# Patient Record
(demographics unavailable — no encounter records)

---

## 2025-01-13 NOTE — HISTORY OF PRESENT ILLNESS
[de-identified] : 54 y/o obese female with h/o obesity, spinal stenosis s/p fusion in 2013 presents for annual exam.    Doing okay.  No changes in weight.  Mounjaro not covered by insurance.  No change after course of Saxenda.  Interested in restarting Qsymia as has helped in the past   Employed former smoker

## 2025-01-13 NOTE — HEALTH RISK ASSESSMENT
[Good] : ~his/her~  mood as  good [No] : In the past 12 months have you used drugs other than those required for medical reasons? No [No falls in past year] : Patient reported no falls in the past year [0] : 2) Feeling down, depressed, or hopeless: Not at all (0) [PHQ-2 Negative - No further assessment needed] : PHQ-2 Negative - No further assessment needed [Former] : Former [5-9] : 5-9 [> 15 Years] : > 15 Years [[id number]] : 0 [MammogramDate] : 12/2024 [PapSmearDate] : 2024 [ColonoscopyDate] : 2021 [ColonoscopyComments] : Advised to have records faxed over as not in chart

## 2025-01-13 NOTE — PHYSICAL EXAM
[Normal] : normal rate, regular rhythm, normal S1 and S2 and no murmur heard [Soft] : abdomen soft [Non Tender] : non-tender [Non-distended] : non-distended [No HSM] : no HSM [Normal Posterior Cervical Nodes] : no posterior cervical lymphadenopathy [Normal Anterior Cervical Nodes] : no anterior cervical lymphadenopathy [No CVA Tenderness] : no CVA  tenderness [No Joint Swelling] : no joint swelling [No Rash] : no rash [No Focal Deficits] : no focal deficits [Normal Affect] : the affect was normal [Normal Mood] : the mood was normal

## 2025-01-13 NOTE — ASSESSMENT
[FreeTextEntry1] : //  h/o RBBB, no chest pain, palpitations, or soboe. Cardio work-up normal in 2017.  holter monitor-negative in 2022 -monitor   Hyperlipidemia, last LDL-156 -continue atorvastatin  -Advised decrease greasy, fatty foods, increase exercise, fiber intake  -Elevated cholesterol has been linked to increase in cardiovascular even such as heart attack, stroke, peripheral artery disease and death.    Obesity, no major change over the last several years.  Plateaued somewhat with Qsymia.  Stopped Saxenda earlier this year due to episodes of diarrhea, symptoms grossly resolved.  mounjaro-not covered  Has starting walking regimen with her sister-in-law.   -restart Qsymia, risks and benefits of medication discussed in detail   -Being overweight increases your risk of health conditions such as heart disease, high blood pressure, type 2 diabetes, and certain types of cancer. It can also increase your risk for osteoarthritis, sleep apnea, and other respiratory problems. Aim for a slow, steady weight loss. Even a small amount of weight loss can lower your risk of health problems. -A safe and healthy way to lose weight is to eat fewer calories and get regular exercise. You can lose up about 1 pound a week by decreasing the number of calories you eat by 500 calories each day. You can decrease calories by eating smaller portion sizes or by cutting out high-calorie foods. Read labels to find out how many calories are in the foods you eat. You can also burn calories with exercise such as walking, swimming, or biking. You will be more likely to keep weight off if you make these changes part of your lifestyle. -Exercise at least 30 minutes per day on most days of the week. Some examples of exercise include walking, biking, dancing, and swimming. You can also fit in more physical activity by taking the stairs instead of the elevator or parking farther away from stores.  Healthcare Maintenance -Advise Yearly Skin cancer screening with Dermatologist  -Advise Yearly Eye exam with Ophthalmologist -Advise Yearly Dental exam -Educated of the importance of Healthy diet, such as Mediterranean Diet and Exercise, such as walking >20 minutes a day and increasing gradually as tolerated  Immunizations -Flu vaccine -declined  -Covid vaccine  -shingles vaccine (shingrix) -up to date   Preventative screening  -advised to get PAP for cervical cancer screening -up to date  -advised to get mammogram for breast cancer screening -up to date  -advised to get bone density for osteoporosis screening -will start next year  -advised to get colonoscopy for colon cancer screening -up to date, have report faxed to review   Follow-up in 2 months for weight check

## 2025-01-30 NOTE — ADDENDUM
[FreeTextEntry1] : I, BUSHRA MANCIA, acted solely as a scribe for Dr. Mika Olivarez on this date 01/30/2025.  All medical record entries made by the Scribe were at my, Dr. Mika Olivarez, direction and personally dictated by me on 01/30/2025. I have reviewed the chart and agree that the record accurately reflects my personal performance of the history, physical exam, assessment and plan. I have also personally directed, reviewed, and agreed with the chart.

## 2025-01-30 NOTE — DISCUSSION/SUMMARY
[de-identified] : I went over the pathophysiology of the patient's symptoms in great detail with the patient. I discussed the underlying pathophysiology of the patient's condition in great detail with the patient. I went over the patient's x-rays with them in great detail. The patient elected to receive a cortisone injection into her left knee today, and tolerated it well. I instructed the patient on ROM exercises, and told them to take it easy. The use of ice and rest was reviewed with the patient. The patient may resume activities tomorrow. Viscosupplementation was discussed as a solution to the patient's symptoms and a booklet with information was provided. I stressed the importance of weight loss and its benefits to the patients joints and overall health. We discussed the use of ice, Tylenol and anti-inflammatories to relieve pain. Viscosupplementation was discussed as a solution to the patient's symptoms, and we are requesting Monovisc for the left knee.   All of their questions were answered. They understand and consent to the plan.   FU after authorization.

## 2025-01-30 NOTE — HISTORY OF PRESENT ILLNESS
[de-identified] : 55 year old female presents complaining of left knee pain that started about 6 weeks ago. The patient cannot attribute her pain to any injury, fall, or trauma. She states that her pain is worst about the anterior and medial aspects. She denies buckling. She has pain when going up and down stairs. She has pain when sleeping at night. She has not been treated for her knee in the past. She has difficulty kneeling, squatting, and standing up from a seated position. Patient states that she has difficulty when ascending and descending stairs. Of note, her right hip was replaced by Dr. Kelly.

## 2025-01-30 NOTE — PHYSICAL EXAM
[de-identified] : Constitutional o Appearance : well-nourished, well developed, alert, in no acute distress  Head and Face o Head :  Inspection : atraumatic, normocephalic o Face :  Inspection : no visible rash or discoloration Respiratory o Respiratory Effort: breathing unlabored  Neurologic o Mental Status Examination :  Orientation : grossly oriented to person, place and time Psychiatric o Mood and Affect: mood normal, affect appropriate   Right Lower Extremity o Buttock : no tenderness, swelling or deformities  o Right Hip :  Inspection/Palpation : no tenderness, swelling or deformities  Range of Motion : full and painless in all planes, no crepitance  Stability : joint stability intact  Strength : extension, flexion, adduction, abduction, internal rotation and external rotation 5/5   o Right Knee :  Inspection/Palpation : no medial compartment tenderness or lateral facet tenderness  to palpation, no swelling  Range of Motion : active flexion and extension full and painless, no crepitance, decreased patellofemoral glide   Stability : no valgus or varus instability present on provocative testing  Strength : flexion and extension 5/5  Tests and Signs : Anterior Drawer negative, Lachman negative, Jose's negative  Left Lower Extremity o Buttock : no tenderness, swelling or deformities  o Left Hip :  Inspection/Palpation : no tenderness, no swelling or deformities  Range of Motion : full and painless in all planes, no crepitance  Stability : joint stability intact  Strength : extension, flexion, adduction, abduction, internal rotation and external rotation 5/5  o Left Knee :  Inspection/Palpation : mild medial compartment tenderness to palpation, no swelling  Range of Motion : 0-115 active flexion and extension full and painless, + crepitance, decreased patellofemoral glide   Stability : no valgus or varus instability present on provocative testing  Strength : flexion and extension 5/5  Tests and Signs : Anterior Drawer negative, Lachman negative, Jose's negative  Gait: normal gait, no significant extremity swelling or lymphedema  Radiology Results: 1/30/2025  Right Knee: Standing AP, lateral, tunnel and skyline views were obtained and reveal severe patellofemoral arthritis with moderate lateral compartment and patellofemoral arthritis  Left Knee: Standing AP, lateral, tunnel and skyline views were obtained and reveal moderate to severe medial and patellofemoral   o Knee injection : Indication- knee osteoarthritis, Anatomic location- left intra-articular joint space, Spray - area was sterilized with Betadine and alcohol and anesthetized with Ethyl Chloride , needle used-20G, Medications given- 5cc's lidocaine, 0.5cc's kenalog, 0.5 cc's dexamethasone. The patient tolerated the procedure well.

## 2025-03-06 NOTE — HISTORY OF PRESENT ILLNESS
[de-identified] : 56 yo f has been gradually noticing difficulty hearing in busy areas and hearing the television. No inciting event. She has not had hearing problems in the past. Denies noise exposure. No more vertigo. Here with her .

## 2025-03-06 NOTE — HISTORY OF PRESENT ILLNESS
[de-identified] : 54 yo f has been gradually noticing difficulty hearing in busy areas and hearing the television. No inciting event. She has not had hearing problems in the past. Denies noise exposure. No more vertigo. Here with her .

## 2025-03-06 NOTE — ASSESSMENT
[FreeTextEntry1] : b snhl- explained how hf snhl makes it difficult to hear over background noise and with tv rec repeat  1 yr ,asked to call for problems sooner, conservative mgmt at present

## 2025-03-13 NOTE — ADDENDUM
[FreeTextEntry1] : I, BUSHRA MANCIA, acted solely as a scribe for Dr. Mika Olivarez on this date 03/13/2025.  All medical record entries made by the Scribe were at my, Dr. Mika Olivarez, direction and personally dictated by me on 03/13/2025. I have reviewed the chart and agree that the record accurately reflects my personal performance of the history, physical exam, assessment and plan. I have also personally directed, reviewed, and agreed with the chart.

## 2025-03-13 NOTE — DISCUSSION/SUMMARY
[de-identified] : I went over the pathophysiology of the patient's symptoms in great detail with the patient. The patient elected to receive a Durolane injection into her left knee today, and tolerated it well. I instructed the patient on ROM exercises, and told them to take it easy. The use of ice and rest was reviewed with the patient. The patient may resume activities tomorrow. I reminded the patient that it takes 4 to 6 weeks after the final injection to feel symptom relief.   All of their questions were answered. They understand and consent to the plan.   FU in 6 weeks.

## 2025-03-13 NOTE — HISTORY OF PRESENT ILLNESS
[de-identified] : 55 year old female presents for a left knee Durolane injection today 3/13/2025. She denies any swelling or buckling. She had a left knee cortisone injection on 1/30/2025 and reports relief. Of note, her right hip was replaced by Dr. Kelly.   Radiology Results: 1/30/2025  Right Knee: Standing AP, lateral, tunnel and skyline views were obtained and reveal severe patellofemoral arthritis with moderate lateral compartment and patellofemoral arthritis  Left Knee: Standing AP, lateral, tunnel and skyline views were obtained and reveal moderate to severe medial and patellofemoral

## 2025-03-13 NOTE — PHYSICAL EXAM
[de-identified] : Constitutional o Appearance : well-nourished, well developed, alert, in no acute distress  Head and Face o Head :  Inspection : atraumatic, normocephalic o Face :  Inspection : no visible rash or discoloration Respiratory o Respiratory Effort: breathing unlabored  Neurologic o Mental Status Examination :  Orientation : grossly oriented to person, place and time Psychiatric o Mood and Affect: mood normal, affect appropriate   Right Lower Extremity o Buttock : no tenderness, swelling or deformities  o Right Hip :  Inspection/Palpation : no tenderness, swelling or deformities  Range of Motion : full and painless in all planes, no crepitance  Stability : joint stability intact  Strength : extension, flexion, adduction, abduction, internal rotation and external rotation 5/5   o Right Knee :  Inspection/Palpation : no medial compartment tenderness or lateral facet tenderness  to palpation, no swelling  Range of Motion : active flexion and extension full and painless, no crepitance, decreased patellofemoral glide   Stability : no valgus or varus instability present on provocative testing  Strength : flexion and extension 5/5  Tests and Signs : Anterior Drawer negative, Lachman negative, Jose's negative  Left Lower Extremity o Buttock : no tenderness, swelling or deformities  o Left Hip :  Inspection/Palpation : no tenderness, no swelling or deformities  Range of Motion : full and painless in all planes, no crepitance  Stability : joint stability intact  Strength : extension, flexion, adduction, abduction, internal rotation and external rotation 5/5  o Left Knee :  Inspection/Palpation : mild medial compartment tenderness to palpation, no swelling  Range of Motion : 0-115 active flexion and extension full and painless, + crepitance, decreased patellofemoral glide   Stability : no valgus or varus instability present on provocative testing  Strength : flexion and extension 5/5  Tests and Signs : Anterior Drawer negative, Lachman negative, Jose's negative  Gait: normal gait, no significant extremity swelling or lymphedema  o Knee injection : Indication- left knee osteoarthritis, Anatomic location- left intra-articular joint space, Spray - area was sterilized with Betadine and alcohol and anesthetized with Ethyl Chloride , needle used-20G, Medications given- 3cc's Durolane under Ultrasound guidance. The patient tolerated the procedure well.

## 2025-03-18 NOTE — PHYSICAL EXAM
[General Appearance - Well Developed] : well developed [Normal Appearance] : normal appearance [Well Groomed] : well groomed [General Appearance - Well Nourished] : well nourished [No Deformities] : no deformities [General Appearance - In No Acute Distress] : no acute distress [Eyelids - No Xanthelasma] : the eyelids demonstrated no xanthelasmas [Normal Oral Mucosa] : normal oral mucosa [No Oral Pallor] : no oral pallor [No Oral Cyanosis] : no oral cyanosis [Normal Jugular Venous A Waves Present] : normal jugular venous A waves present [Normal Jugular Venous V Waves Present] : normal jugular venous V waves present [No Jugular Venous Harper A Waves] : no jugular venous harper A waves [Respiration, Rhythm And Depth] : normal respiratory rhythm and effort [Exaggerated Use Of Accessory Muscles For Inspiration] : no accessory muscle use [Auscultation Breath Sounds / Voice Sounds] : lungs were clear to auscultation bilaterally [Heart Rate And Rhythm] : heart rate and rhythm were normal [Heart Sounds] : normal S1 and S2 [Murmurs] : no murmurs present [Abdomen Soft] : soft [Abdomen Tenderness] : non-tender [Abdomen Mass (___ Cm)] : no abdominal mass palpated [Abnormal Walk] : normal gait [Gait - Sufficient For Exercise Testing] : the gait was sufficient for exercise testing [] : no rash [No Skin Ulcers] : no skin ulcer [No Xanthoma] : no  xanthoma was observed [Oriented To Time, Place, And Person] : oriented to person, place, and time [Affect] : the affect was normal [Mood] : the mood was normal [No Anxiety] : not feeling anxious [Well Developed] : well developed [Well Nourished] : well nourished [No Acute Distress] : no acute distress [Normal Conjunctiva] : normal conjunctiva [Normal Venous Pressure] : normal venous pressure [No Carotid Bruit] : no carotid bruit [Normal S1, S2] : normal S1, S2 [No Rub] : no rub [No Gallop] : no gallop [Clear Lung Fields] : clear lung fields [Good Air Entry] : good air entry [No Respiratory Distress] : no respiratory distress  [Soft] : abdomen soft [Non Tender] : non-tender [No Masses/organomegaly] : no masses/organomegaly [Normal Bowel Sounds] : normal bowel sounds [Normal Gait] : normal gait [No Edema] : no edema [No Cyanosis] : no cyanosis [No Clubbing] : no clubbing [No Varicosities] : no varicosities [No Rash] : no rash [No Skin Lesions] : no skin lesions [Moves all extremities] : moves all extremities [No Focal Deficits] : no focal deficits [Normal Speech] : normal speech [Alert and Oriented] : alert and oriented [Normal memory] : normal memory [FreeTextEntry1] : +LE Venous Stasis Changes [de-identified] : +II/VI CONG

## 2025-03-18 NOTE — HISTORY OF PRESENT ILLNESS
[FreeTextEntry1] : 47 y.o.F - h.o. Pseudotumor Cerebri, Arthritis, HTN, HL, Former Smoker, Obesity, Early Coronary Disease and AAA in Father, HTN on Triamterene,Venous Insufficiency, RBBB presenting for cardiovascular evaluation ------------------ =========== Patients walks well without limitation. No reported CP/SOB/Palpitations Joined Weight watcher's - lost weight ----------------  Primary picked up a new right bundle branch block Otherwise feeling wel.  Some wreight loss.  Some fluttering.  TTE -  - LVEF  Nl; Mild to Mod MR Holter/Zio- - Nl =============== =============== ----------------- ----------------- 3-2025 CC: Heart Issues - Patient reports no chest pain, no localization to the sternum, no radiation to the neck/jaw, no alleviating nor worsening precipitants to CP, no assoc symptoms to CP no alleviating nor worsening precipitants to CP, no assoc symptoms to CP - Patient notes no associated SOB/Palps/Leg swelling - Reports No associated F/C/N/V/Headaches - Reports Normal Exercise Tolerance - Reports no medication changes - Reports normal mood/quality of life - Reports no associated midnight awakenings from cP - Reports no diet changes - Reports no associated body aches - Reports no recent colds/viruses - Recent labs/imaging reviewed - Relevant Family history reviewed Mother/Father - CV Risk Assessment for 10 Year ACC/AHA Pooled Risk Cohort Equation places this person at < 7.5% Risk of ASCVD  ------------------

## 2025-03-18 NOTE — REVIEW OF SYSTEMS
[Negative] : Heme/Lymph [Dyspnea on exertion] : dyspnea during exertion [Palpitations] : no palpitations [FreeTextEntry1] : Chronic LE Venous Stasis Changes

## 2025-03-18 NOTE — PHYSICAL EXAM
[General Appearance - Well Developed] : well developed [Normal Appearance] : normal appearance [Well Groomed] : well groomed [General Appearance - Well Nourished] : well nourished [No Deformities] : no deformities [General Appearance - In No Acute Distress] : no acute distress [Eyelids - No Xanthelasma] : the eyelids demonstrated no xanthelasmas [Normal Oral Mucosa] : normal oral mucosa [No Oral Pallor] : no oral pallor [No Oral Cyanosis] : no oral cyanosis [Normal Jugular Venous A Waves Present] : normal jugular venous A waves present [Normal Jugular Venous V Waves Present] : normal jugular venous V waves present [No Jugular Venous Harper A Waves] : no jugular venous harper A waves [Respiration, Rhythm And Depth] : normal respiratory rhythm and effort [Exaggerated Use Of Accessory Muscles For Inspiration] : no accessory muscle use [Auscultation Breath Sounds / Voice Sounds] : lungs were clear to auscultation bilaterally [Heart Rate And Rhythm] : heart rate and rhythm were normal [Heart Sounds] : normal S1 and S2 [Murmurs] : no murmurs present [Abdomen Soft] : soft [Abdomen Tenderness] : non-tender [Abdomen Mass (___ Cm)] : no abdominal mass palpated [Abnormal Walk] : normal gait [Gait - Sufficient For Exercise Testing] : the gait was sufficient for exercise testing [] : no rash [No Skin Ulcers] : no skin ulcer [No Xanthoma] : no  xanthoma was observed [Oriented To Time, Place, And Person] : oriented to person, place, and time [Affect] : the affect was normal [Mood] : the mood was normal [No Anxiety] : not feeling anxious [Well Developed] : well developed [Well Nourished] : well nourished [No Acute Distress] : no acute distress [Normal Conjunctiva] : normal conjunctiva [Normal Venous Pressure] : normal venous pressure [No Carotid Bruit] : no carotid bruit [Normal S1, S2] : normal S1, S2 [No Rub] : no rub [No Gallop] : no gallop [Clear Lung Fields] : clear lung fields [Good Air Entry] : good air entry [No Respiratory Distress] : no respiratory distress  [Soft] : abdomen soft [Non Tender] : non-tender [No Masses/organomegaly] : no masses/organomegaly [Normal Bowel Sounds] : normal bowel sounds [Normal Gait] : normal gait [No Edema] : no edema [No Cyanosis] : no cyanosis [No Clubbing] : no clubbing [No Varicosities] : no varicosities [No Rash] : no rash [No Skin Lesions] : no skin lesions [Moves all extremities] : moves all extremities [No Focal Deficits] : no focal deficits [Normal Speech] : normal speech [Alert and Oriented] : alert and oriented [Normal memory] : normal memory [FreeTextEntry1] : +LE Venous Stasis Changes [de-identified] : +II/VI CONG

## 2025-03-18 NOTE — DISCUSSION/SUMMARY
[EKG obtained to assist in diagnosis and management of assessed problem(s)] : EKG obtained to assist in diagnosis and management of assessed problem(s) [FreeTextEntry1] : 47 y.o.F - h.o. Pseudotumor Cerebri, Arthritis, HTN, HL, Former Smoker, Obesity, Early Coronary Disease and AAA in Father,Chronic LE Edema, HTN on Triamterene ------------------ ============ TTE -  - LVEF  Nl; Mild to Mod MR Holter/Zio- - Nl ------------------ ============ 1. Early CAD RFs/HTN - Intermediate CAD Risk - Will use CT Coronary Score to help guide risk management/Statin therapy - Baby ASA - Continue Wt Watchers - Cointinue current meds 1.  RBBB Mn Systolic Murmur (holosystolic) I.VI apical c/f MR AND JHAVERI New - TTE -Echo  2. [place]  LE ed - refer to Dr. Gaitan                        Cholesterol management - Assessed - Impression is active; changes as per above - Discussed diet and exercise at length - Any lifestyle/medication changes as per above Risk factors for cardiomyopathy - Assessed - Impression is stable - Reviewed records from PCP BP - Assessed - Impression is active Cardiac Health optimization - Discussed diet and exercise at length - Discussed importance of monitoring and re-assessment of cardiac health on further visits                          ----------- 15 minutes was provided for preventive counseling on healthy diet, weight maintenance, CV risk reduction. Specifically, and separate from other cardiovascular evaluation and treatment, we discussed h willingness to focus  on lifestyle changes, particularly dietary; including greater consumption of vegetables, fruits over saturated fats. We discussed appropriate follow up to monitor progress on these areas.     -                                                                                                             -                         -

## 2025-03-23 NOTE — HISTORY OF PRESENT ILLNESS
[FreeTextEntry1] : new pt establish care [de-identified] : Ms. MONTIEL is a 55-year-old F pmhx of HLD, Arthritis, Vertigo, morbid obesity, b/l sensorineural hearing loss, alopecia who presents at the office today to establish care. Dr. Swann referred pt to me. Overall, pt feels well. No complaints. Denies chest pain, SOB, JHAVERI, dizziness, diaphoresis, palpitations, LE swelling, orthopnea, syncope, n/v, headache.

## 2025-03-23 NOTE — HISTORY OF PRESENT ILLNESS
[FreeTextEntry1] : new pt establish care [de-identified] : Ms. MONTIEL is a 55-year-old F pmhx of HLD, Arthritis, Vertigo, morbid obesity, b/l sensorineural hearing loss, alopecia who presents at the office today to establish care. Dr. Swann referred pt to me. Overall, pt feels well. No complaints. Denies chest pain, SOB, JHAVERI, dizziness, diaphoresis, palpitations, LE swelling, orthopnea, syncope, n/v, headache.

## 2025-03-23 NOTE — HISTORY OF PRESENT ILLNESS
[FreeTextEntry1] : new pt establish care [de-identified] : Ms. MONTIEL is a 55-year-old F pmhx of HLD, Arthritis, Vertigo, morbid obesity, b/l sensorineural hearing loss, alopecia who presents at the office today to establish care. Dr. Swann referred pt to me. Overall, pt feels well. No complaints. Denies chest pain, SOB, JHAVERI, dizziness, diaphoresis, palpitations, LE swelling, orthopnea, syncope, n/v, headache. Neurology Attending

## 2025-03-23 NOTE — HEALTH RISK ASSESSMENT
[0] : 2) Feeling down, depressed, or hopeless: Not at all (0) [PHQ-2 Negative - No further assessment needed] : PHQ-2 Negative - No further assessment needed [Former] : Former [[id number]] : 0

## 2025-03-23 NOTE — HEALTH RISK ASSESSMENT
[0] : 2) Feeling down, depressed, or hopeless: Not at all (0) [PHQ-2 Negative - No further assessment needed] : PHQ-2 Negative - No further assessment needed [Former] : Former [[id number]] : 0

## 2025-03-23 NOTE — HEALTH RISK ASSESSMENT
[0] : 2) Feeling down, depressed, or hopeless: Not at all (0) [PHQ-2 Negative - No further assessment needed] : PHQ-2 Negative - No further assessment needed [Former] : Former [[id number]] : 0

## 2025-03-23 NOTE — ADDENDUM
[FreeTextEntry1] : This note was written by Serenity Call on 03/20/2025 acting as medical scribe for Dr. Peter Plata. I, Dr. Peter Plata, have read and attest that all the information, medical decision making and discharge instructions within are true and accurate.

## 2025-04-24 NOTE — ADDENDUM
[FreeTextEntry1] : I, BUSHRA MANCIA, acted solely as a scribe for Dr. Mika Olivarez on this date 04/24/2025.  All medical record entries made by the Scribe were at my, Dr. Mika Olivarez, direction and personally dictated by me on 04/24/2025. I have reviewed the chart and agree that the record accurately reflects my personal performance of the history, physical exam, assessment and plan. I have also personally directed, reviewed, and agreed with the chart.

## 2025-04-24 NOTE — PHYSICAL EXAM
[de-identified] : Constitutional o Appearance : well-nourished, well developed, alert, in no acute distress  Head and Face o Head :  Inspection : atraumatic, normocephalic o Face :  Inspection : no visible rash or discoloration Respiratory o Respiratory Effort: breathing unlabored  Neurologic o Mental Status Examination :  Orientation : grossly oriented to person, place and time Psychiatric o Mood and Affect: mood normal, affect appropriate   Right Lower Extremity o Buttock : no tenderness, swelling or deformities  o Right Hip :  Inspection/Palpation : no tenderness, swelling or deformities  Range of Motion : full and painless in all planes, no crepitance  Stability : joint stability intact  Strength : extension, flexion, adduction, abduction, internal rotation and external rotation 4/5   o Right Knee :  Inspection/Palpation : no medial compartment tenderness or lateral facet tenderness  to palpation, no swelling  Range of Motion : active flexion and extension full and painless, no crepitance, decreased patellofemoral glide   Stability : no valgus or varus instability present on provocative testing  Strength : flexion and extension 4/5  Tests and Signs : Anterior Drawer negative, Lachman negative, Jose's negative  Left Lower Extremity o Buttock : no tenderness, swelling or deformities  o Left Hip :  Inspection/Palpation : no tenderness, no swelling or deformities  Range of Motion : full and painless in all planes, no crepitance  Stability : joint stability intact  Strength : extension, flexion, adduction, abduction, internal rotation and external rotation 4/5  o Left Knee :  Inspection/Palpation : mild medial compartment tenderness to palpation, no swelling  Range of Motion : 0-115 active flexion and extension full and painless, patellofemoral crepitance, decreased patellofemoral glide   Stability : no valgus or varus instability present on provocative testing  Strength : flexion and extension 4/5  Tests and Signs : Anterior Drawer negative, Lachman negative, Jose's negative  Gait: normal gait, limited core strength, no significant extremity swelling or lymphedema

## 2025-04-24 NOTE — HISTORY OF PRESENT ILLNESS
[de-identified] : 55 year old female presents for a left knee follow-up. She had a left knee Durolane injection on 3/13/2025 and reports it did not work. She denies any swelling or buckling. She does note some weakness. She has difficulty kneeling, squatting, and standing up from a seated position.  She had a left knee cortisone injection on 1/30/2025 and reports relief for a couple of weeks. She has lost 6 pounds. Of note, her right hip was replaced by Dr. Kelly.   Radiology Results: 1/30/2025  Right Knee: Standing AP, lateral, tunnel and skyline views were obtained and reveal severe patellofemoral arthritis with moderate lateral compartment and patellofemoral arthritis  Left Knee: Standing AP, lateral, tunnel and skyline views were obtained and reveal moderate to severe medial and patellofemoral

## 2025-05-12 NOTE — REASON FOR VISIT
[FreeTextEntry1] :  Telehealth visit today on [facility]. Telehealth was done using 2 way audiovisual.  Patient was at home. Provider was in home office.  Consent was provided by patient.  Only patient and provider in the visit.

## 2025-05-12 NOTE — HISTORY OF PRESENT ILLNESS
[FreeTextEntry1] : Ms. PARISH MONTIEL  is a 55 year old female who has a past medical history significant for  Pseudotumor Cerebri, Arthritis, HTN, HL, Former Smoker, Obesity, Early Coronary Disease and AAA in Father, HTN on Triamterene,Venous Insufficiency, RBBB presenting for cardiovascular evaluation who presents via telehealth for a follow up evaluation. Patient feels generally well today. Denies SOB, chest pain, palpitations, dizziness, and syncope.     ================ ================    5/12/2025 MED:  Currently on  Zepbound 2.5 mg  mg dose,  atorvastatin 20 mg daily  - Appetite suppression not present. - No side effects, feels well. - Has lost 6 lbs -Following low cholesterol diet, making lifestyle modifications and taking Anti cholesterol medications as directed, - Will start new dose  Zepbound 5 mg and reassess in one month

## 2025-05-12 NOTE — DISCUSSION/SUMMARY
[FreeTextEntry1] :   Diagnosis: HLD, morbid obesity   - Patient to start  Zepbound 5 mg mg once weekly for four weeks. All side effects reviewed. Questions and concerns addressed. - A detailed discussion took place about the importance of CV risk reduction - The patient understands the importance of incorporating moderate aerobic exercise, 4 times/week for 40 minutes to reduce risk of CV disease. - A detailed discussion of lifestyle modification was done today. Patient understands the importance of a heart healthy diet and incorporating veggies/legumes/fruits/whole grains and limiting processed foods, sugars and carbs in their diet. - patient understands that stress reduction along with good sleep hygiene will help aid in weight loss -Following low cholesterol diet, making lifestyle modifications and taking Anti cholesterol medications. - Follow up in 4 weeks   - The patient has a good understanding of the diagnosis, treatment plan and lifestyle modification. she will contact me at the office for any questions with their care or any changes in their health status.   Case discussed with Dr Max Swann.

## 2025-07-07 NOTE — HISTORY OF PRESENT ILLNESS
[FreeTextEntry1] : Establish Care, CPE [de-identified] : Ms. MONTIEL is a 56 year old female that presents to the office as a new patient for a CPE. Pt feeling well overall, no complaints. Some lower extremity swelling of ankles - went to vascular - normal workup. PMHx: Pseudotumor Cerebri, Arthritis, HTN, HLD, Former Smoker, Obesity, Early Coronary Disease and AAA in Father, HTN on Triamterene, Venous Insufficiency, RBBB Last used Triamterene a few years ago Taking zepbound 5mg qweekly Atorvastatin 20mg Doing intermittent fasting  F/w ortho for L knee pain, s/p injection  colonoscopy: 9/2020, GI referral, clean, 10 year repeat mammogram: 12/2025 pap smear: 6/2024, follows with GYN annually  Recent ECG, March 2025 reviewed, SR, KIM Had TTE in March  Weight stable since March, 281 lb BP WNL, 110/70  Fasting labs today Shingrix UTD, x2 doses

## 2025-07-07 NOTE — DISCUSSION/SUMMARY
[FreeTextEntry1] :   - Patient continue Zepbound 5mg once weekly for four weeks. All side effects reviewed. Questions and concerns addressed. start IB guard as directed and Pepto bismol at time of injection for the 24-48hours to improve symptoms of diarrhea - increase protein intake  - prescription e-prescribed to patients agreed upon [facility] pharmacy  - A detailed discussion took place about the importance of CV risk reduction - The patient understands the importance of incorporating moderate aerobic exercise, 4 times/week for 40 minutes to reduce risk of CV disease. - A detailed discussion of lifestyle modification was done today. Patient understands the importance of a heart healthy diet and incorporating veggies/legumes/fruits/whole grains and limiting processed foods, sugars and carbs in their diet. - patient understands that stress reduction along with good sleep hygiene will help aid in weight loss - Follow up in 4 weeks   - The patient has a good understanding of the diagnosis, treatment plan and lifestyle modification. she will contact me at the office for any questions with their care or any changes in their health status.   Case discussed with Dr Max Swann.

## 2025-07-07 NOTE — HEALTH RISK ASSESSMENT
[0] : 2) Feeling down, depressed, or hopeless: Not at all (0) [PHQ-2 Negative - No further assessment needed] : PHQ-2 Negative - No further assessment needed [Former] : Former [Time Spent: ___ Minutes] : I spent [unfilled] minutes performing a depression screening for this patient. [None] : None [Feels Safe at Home] : Feels safe at home [Fully functional (bathing, dressing, toileting, transferring, walking, feeding)] : Fully functional (bathing, dressing, toileting, transferring, walking, feeding) [Fully functional (using the telephone, shopping, preparing meals, housekeeping, doing laundry, using] : Fully functional and needs no help or supervision to perform IADLs (using the telephone, shopping, preparing meals, housekeeping, doing laundry, using transportation, managing medications and managing finances) [Patient/Caregiver not ready to engage] : , patient/caregiver not ready to engage [[id number]] : 0 [Change in mental status noted] : No change in mental status noted [Reports changes in hearing] : Reports no changes in hearing [Reports changes in vision] : Reports no changes in vision [Reports changes in dental health] : Reports no changes in dental health [AdvancecareDate] : 7/7/25

## 2025-07-07 NOTE — HISTORY OF PRESENT ILLNESS
[FreeTextEntry1] : Telehealth visit today on [facility]. Telehealth was done using 2-way audiovisual. Patient was at home. Provider was in home office. Consent was obtained by patient and provider in the visit.   ------------------------------------------------------------------------     Ms. PARISH MONTIEL is a 55 year old female who has a past medical history significant for Pseudotumor Cerebri, Arthritis, HTN, HL, Former Smoker, Obesity, Early Coronary Disease and AAA in Father, HTN on Triamterene,Venous Insufficiency, RBBB presenting for cardiovascular evaluation who presents via telehealth for a follow up evaluation. Patient feels generally well today. Denies SOB, chest pain, palpitations, dizziness, and syncope.  ================ 2025 MED: Currently on Zepbound 2.5 mg mg dose, atorvastatin 20 mg daily - Appetite suppression not present. - No side effects, feels well. - Has lost 6 lbs -Following low cholesterol diet, making lifestyle modifications and taking Anti cholesterol medications as directed, - Will start new dose Zepbound 5 mg and reassess in one month =============   2025 Zepbound 5mg weekly Monday after injection experiences 12-14hours of diarrhea over the next 24hours, no pain, no discomfort or food that day and then it resolves -now diarrhea has improved to only 5-6hours in the 24-48hours after the initial injection -she works from home so she has been able to adapt to these episodes and does not want to stop the medication -advised to start IB guard as directed and Pepto bismol at time of injection for the 24-48hours to improve symptoms of diarrhea -has not noted a decrease in food cravings, but has made her own lifestyle changes being aware of her food choices -she started intermittent fasting this past 1 week from 7:30p to 11:30am, however, he diet only consists of breakfast fruit/whipped cream, snack chicken leg and rice a joanne and buger for dinner. I advised her to increase protein intake, even incorporating protein shakes throughout the day to promote weight loss and increased calorie intake and decreased muscle mass  - uses a stationary bike at home for exercise as limited by knee pain  - pharmacy reviewed [facility]  ================================================================= 25  Drug Zepbound 5 mg  - Symptoms diarrhea improved  - weight 181 - no real change but has had some family crisis - Brother-in-Law  suddenly  - diet good but needs more support with food choices > She is not hungry and then she is starving  - cravings none  - food noise - protein intake WAs doing intermittent fasting encouraged to eat small frequent meals high in protein  - Does not exercise - encouraged to walk 15 minutes 2 x daily and increase   duration over time  - water intake needs to increase drinks Darrell  - Pharmacy verified [facility] - Plan:  - Maintain on Zepbound 5 mg for next 4 weeks  - Plan to increase if no change in weight on next visit  - Nutrition consult  - Follow up in 4 weeks  - Telehealth visits today on [facility]. Tele agata was done today using a two-way audiovisual. Patient was at home. Provider was in office (or home office) Consent was provided by patient. Only the patient and provider in the visit.

## 2025-07-07 NOTE — PHYSICAL EXAM
[No Acute Distress] : no acute distress [Well Nourished] : well nourished [Well Developed] : well developed [Well-Appearing] : well-appearing [Normal Sclera/Conjunctiva] : normal sclera/conjunctiva [PERRL] : pupils equal round and reactive to light [EOMI] : extraocular movements intact [Normal Outer Ear/Nose] : the outer ears and nose were normal in appearance [Normal Oropharynx] : the oropharynx was normal [No JVD] : no jugular venous distention [No Lymphadenopathy] : no lymphadenopathy [Supple] : supple [Thyroid Normal, No Nodules] : the thyroid was normal and there were no nodules present [No Respiratory Distress] : no respiratory distress  [No Accessory Muscle Use] : no accessory muscle use [Clear to Auscultation] : lungs were clear to auscultation bilaterally [Normal Rate] : normal rate  [Regular Rhythm] : with a regular rhythm [Normal S1, S2] : normal S1 and S2 [No Carotid Bruits] : no carotid bruits [No Varicosities] : no varicosities [Pedal Pulses Present] : the pedal pulses are present [No Edema] : there was no peripheral edema [No Extremity Clubbing/Cyanosis] : no extremity clubbing/cyanosis [Soft] : abdomen soft [Non Tender] : non-tender [Non-distended] : non-distended [Normal Bowel Sounds] : normal bowel sounds [Normal Posterior Cervical Nodes] : no posterior cervical lymphadenopathy [Normal Anterior Cervical Nodes] : no anterior cervical lymphadenopathy [No CVA Tenderness] : no CVA  tenderness [No Spinal Tenderness] : no spinal tenderness [No Joint Swelling] : no joint swelling [Grossly Normal Strength/Tone] : grossly normal strength/tone [No Rash] : no rash [Coordination Grossly Intact] : coordination grossly intact [No Focal Deficits] : no focal deficits [Normal Gait] : normal gait [Normal Affect] : the affect was normal [Alert and Oriented x3] : oriented to person, place, and time